# Patient Record
Sex: MALE | Race: WHITE | URBAN - METROPOLITAN AREA
[De-identification: names, ages, dates, MRNs, and addresses within clinical notes are randomized per-mention and may not be internally consistent; named-entity substitution may affect disease eponyms.]

---

## 2018-02-27 ENCOUNTER — OFFICE VISIT (OUTPATIENT)
Dept: DERMATOLOGY | Age: 53
End: 2018-02-27

## 2018-02-27 DIAGNOSIS — D22.9 MULTIPLE NEVI: Primary | ICD-10-CM

## 2018-02-27 DIAGNOSIS — L21.9 SEBORRHEIC DERMATITIS: ICD-10-CM

## 2018-02-27 DIAGNOSIS — L30.9 DERMATITIS: ICD-10-CM

## 2018-02-27 DIAGNOSIS — D48.5 NEOPLASM OF UNCERTAIN BEHAVIOR OF SKIN: ICD-10-CM

## 2018-02-27 PROCEDURE — 11100 PR BIOPSY OF SKIN LESION: CPT | Performed by: DERMATOLOGY

## 2018-02-27 PROCEDURE — 99214 OFFICE O/P EST MOD 30 MIN: CPT | Performed by: DERMATOLOGY

## 2018-02-27 RX ORDER — CLOBETASOL PROPIONATE 0.46 MG/ML
SOLUTION TOPICAL
Qty: 50 ML | Refills: 1 | Status: SHIPPED | OUTPATIENT
Start: 2018-02-27 | End: 2020-11-10 | Stop reason: SDUPTHER

## 2018-02-27 NOTE — PATIENT INSTRUCTIONS
Biopsy Wound Care Instructions    · Keep the bandage in place for 24 hours. · Cleanse the wound with mild soapy water daily   Gently dry the area.  Apply Vaseline or petroleum jelly to the wound using a cotton tipped applicator.  Cover with a clean bandage.  Repeat this process until the biopsy site is healed.  If you had stitches placed, continue treating the site until the stitches are removed. Remember to make an appointment to return to have your stitches removed by our staff.  You may shower and bathe as usual.       ** Biopsy results generally take around 7 business days to come back. If you have not heard from us by then, please call the office at [phone] between 8AM and 4PM Monday through Friday. Protecting Yourself From the Sun    · Apply broad spectrum water resistant sunscreen with an SPF of at least 30 to exposed areas of the skin. Dont forget the ears and lips! Remember to reapply sunscreen about every 2 hours and after swimming or sweating. · Wear sun protective clothing. Swim shirts (aka. rash guards) are a great idea and negates the need to reapply sunscreen in those areas.      · Seek the shade whenever possible especially between the hours of 10 am and 4 pm when the suns rays are the strongest.     · Avoid tanning beds

## 2018-02-27 NOTE — PROGRESS NOTES
Psych/Neuro, Head/face, Conjunctivae/eyelids, Gums/teeth/lips, Neck, Breast/axilla/chest, Abdomen, LUE, LLE, Nails/digits and buttocks. RUE. The following were examined and determined to be abnormal: Scalp and back, RLE  1. trunk and extremities with scattered brown macules and papules   L chest with larger brown macule - photo in media from 2014 - stable  2.  L upper back/posterior shoulder with iregularly-pigmented brown macule  3. Left temporal scalp with well-defined erythematous finely scaly patch  4. R upper inner thigh with erythematous scaly and slightly lichenified patch/thin plaque; KOH neg    Assessment and Plan      1. Benign-appearing nevi  - educ re ABCD's of MM   educ sun protection   encouraged skin check yearly (sooner if indicated), self checks    2. R/o dysplastic - L upper back/posterior shoulder  - Shave biopsy performed after verbal consent obtained. Patient educated regarding risk of bleeding, infection, scar and educated on wound care. Skin cleansed with alcohol pad and site anesthetized with lido + epi. Aluminum chloride applied to site for hemostasis. Petrolatum ointment and bandage applied. Specimen bottle labeled with patient information and site and specimen sent to [facility]. 3. L temporal scalp - seb derm vs psoriasis, mild  - clobetasol soln daily - bid prn flares; ed se/misuse    4.  Dermatitis - R upper inner thigh  - ultravate sampled  - ed call if worsening and consider topical antifungal

## 2018-03-09 ENCOUNTER — TELEPHONE (OUTPATIENT)
Dept: DERMATOLOGY | Age: 53
End: 2018-03-09

## 2018-03-16 ENCOUNTER — TELEPHONE (OUTPATIENT)
Dept: DERMATOLOGY | Age: 53
End: 2018-03-16

## 2018-04-19 ENCOUNTER — PROCEDURE VISIT (OUTPATIENT)
Dept: DERMATOLOGY | Age: 53
End: 2018-04-19

## 2018-04-19 DIAGNOSIS — D23.9 DYSPLASTIC NEVUS: Primary | ICD-10-CM

## 2018-04-19 PROCEDURE — 12032 INTMD RPR S/A/T/EXT 2.6-7.5: CPT | Performed by: DERMATOLOGY

## 2018-04-19 PROCEDURE — 11402 EXC TR-EXT B9+MARG 1.1-2 CM: CPT | Performed by: DERMATOLOGY

## 2018-04-26 ENCOUNTER — TELEPHONE (OUTPATIENT)
Dept: DERMATOLOGY | Age: 53
End: 2018-04-26

## 2020-11-10 ENCOUNTER — OFFICE VISIT (OUTPATIENT)
Dept: DERMATOLOGY | Age: 55
End: 2020-11-10
Payer: COMMERCIAL

## 2020-11-10 VITALS — TEMPERATURE: 98.1 F

## 2020-11-10 PROCEDURE — 17000 DESTRUCT PREMALG LESION: CPT | Performed by: DERMATOLOGY

## 2020-11-10 PROCEDURE — 11102 TANGNTL BX SKIN SINGLE LES: CPT | Performed by: DERMATOLOGY

## 2020-11-10 PROCEDURE — 99214 OFFICE O/P EST MOD 30 MIN: CPT | Performed by: DERMATOLOGY

## 2020-11-10 RX ORDER — CLOBETASOL PROPIONATE 0.46 MG/ML
SOLUTION TOPICAL
Qty: 50 ML | Refills: 1 | Status: SHIPPED | OUTPATIENT
Start: 2020-11-10

## 2020-11-10 NOTE — PATIENT INSTRUCTIONS
Biopsy Wound Care Instructions    · Keep the bandage in place for 24 hours. · Cleanse the wound with mild soapy water daily   Gently dry the area.  Apply Vaseline or petroleum jelly to the wound using a cotton tipped applicator.  Cover with a clean bandage.  Repeat this process until the biopsy site is healed.  If you had stitches placed, continue treating the site until the stitches are removed. Remember to make an appointment to return to have your stitches removed by our staff.  You may shower and bathe as usual.       ** Biopsy results generally take around 7 business days to come back. If you have not heard from us by then, please call the office at [phone] between 8AM and 4PM Monday through Friday. Cryosurgery (Freezing) Wound Care Instructions    AFTER THE PROCEDURE:    You will notice swelling and redness around the site. This is normal.    You may experience a sharp or sore feeling for the next several days. For this discomfort, you may take acetaminophen (Tylenol©).  A blister may develop at the treated area, sometimes as soon as by the end of the day. After several days, the blister will subside and a scab will form.  If the area is bumped or traumatized during the first few days following freezing, you may develop bleeding into the blister, forming a blood blister. This is nothing to be alarmed about.  If the blister is tense, uncomfortable, or much larger than the site that was frozen, you may pop the blister along its edge with a sterile needle (boiled, heated under a flame, or cleaned with alcohol) to allow the fluid to drain out. If the blister does not bother you, no treatment is needed.  Do NOT peel off the top of the blister roof. It will act as a dressing on top of your wound. WOUND CARE:    You may shower or bathe as usual, but avoid scrubbing the areas that have been frozen.      Cleanse the site twice a day with mild soapy water, and then apply a thin film of white petrolatum (Vaseline©).  You do not need to cover the area, but can if you prefer.  Do NOT allow the site to become dry or crusted, or attempt to dry it out with rubbing alcohol or hydrogen peroxide.  Continue this regimen until the area is pink and healed. Depending on the size and location of your cryosurgery site, healing may take 2 to 4 weeks.  The area may continue to be pink for several weeks, and over the next few months may become darker or lighter than the surrounding skin. This may be a permanent change.    

## 2020-11-10 NOTE — PROGRESS NOTES
[facility]  Bobby Kam  1965    54 y.o. male     Date of Visit: 11/10/2020    Chief Complaint: moles/lesions  Chief Complaint   Patient presents with    Skin Lesion     FSE      HX: multi nevi    Medication Refill     clobetasol, tetrahydrolozine     Last seen: 4-2018; *wife is a patient - Michael Johansen    History of Present Illness:    1. He has multiple asx nevi on the trunk and extremities, many present since child/young adult; no change in s/s/c of any lesions; no bleeding lesions; all asx   S/p bx of an irregularly pigmented, mottled pink and brown lesion on the left lower back. in 2014 - benign. 2. Hx of moderately dysplastic nevus - L upper back/posterior shoulder - excision 2018    3. F/u for seb derm - an intermittently itchy area on the left temporal scalp. Improved with clobetasol spray or soln in the past.  Mild recently - needs refills. 4. Hx of dermatitis - thigh. Cleared with topical steroid. 5. He has a concerning lesion on the lower back. Asx.     6. He has a rough lesion on the R upper lip x several mos. He was not concerned about it. Asx. Previously addressed: Intermittently pruritic eruption on the dorsum of R foot - cleared with triamcinolone. No personal hx of skin cancer; no fam hx of MM. Wife - Michael Johansen; knew from [facility] med. Review of Systems:  Gen: Feels well, good sense of health. Skin: No new or changing moles. Past Medical History, Family History, Surgical History, Medications and Allergies reviewed. 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 No pertinent past medical history. History reviewed. No pertinent surgical history.     Outpatient Medications Marked as Taking for the 11/10/20 encounter (Office Visit) with Rocael Cruz MD   Medication Sig Dispense Refill    clobetasol (TEMOVATE) 0.05 % external solution Apply to affected area BID PRN flares 50 mL 1   *using several different eye gtts. Allergies   Allergen Reactions    Dye [Barium-Containing Compounds] Other (See Comments)     Pt states he got a little hot       Physical Examination        The following were examined and determined to be normal: Psych/Neuro, Head/face, Conjunctivae/eyelids,, Neck, Breast/axilla/chest, Abdomen, LUE, LLE, Nails/digits and buttocks. RUE. RLE    The following were examined and determined to be abnormal: Scalp and back, lip    1. trunk and extremities with scattered brown macules and papules   L chest with larger brown macule - photo in media from 2014 - stable  2. L upper back/posterior shoulder with scar - clear  3. Left temporal scalp with focal mildly finely scaly patch  4. R upper inner thigh clear; no other dermatitis  5. Lower back - irregular brown macule  6. R upper lip with erythematous roughly scaled macule    Assessment and Plan      1. Benign-appearing nevi  2. Hx of dysplastic nevus - L upper back/posterior shoulder, excised 2018  - educ re ABCD's of MM   educ sun protection   encouraged skin check yearly (sooner if indicated), self checks    3. L temporal scalp - seb derm vs psoriasis, mild  - clobetasol soln daily - bid prn flares; ed se/misuse    4. Dermatitis - R upper inner thigh - cleared    5. Lower back - r/o dysplastic nevus  - Shave biopsy performed after verbal consent obtained. Patient educated regarding risk of bleeding, infection, scar and educated on wound care. Skin cleansed with alcohol pad and site anesthetized with lido + epi. Aluminum chloride applied to site for hemostasis. Petrolatum ointment and bandage applied. Specimen bottle labeled with patient information and site and specimen sent to [facility]. 6. AK - R upper lip - 1   - lesion(s)  treated with liquid nitrogen x 2 cycles. Patient educated on risk of blister, hypopigmentation/scar and wound care.

## 2020-11-12 LAB — DERMATOLOGY PATHOLOGY REPORT: NORMAL

## 2023-01-19 ENCOUNTER — OFFICE VISIT (OUTPATIENT)
Dept: DERMATOLOGY | Age: 58
End: 2023-01-19

## 2023-01-19 DIAGNOSIS — D22.9 MULTIPLE NEVI: Primary | ICD-10-CM

## 2023-01-19 DIAGNOSIS — Z86.018 HISTORY OF DYSPLASTIC NEVUS: ICD-10-CM

## 2023-01-19 DIAGNOSIS — L21.9 SEBORRHEIC DERMATITIS: ICD-10-CM

## 2023-01-19 DIAGNOSIS — D48.5 NEOPLASM OF UNCERTAIN BEHAVIOR OF SKIN: ICD-10-CM

## 2023-01-19 DIAGNOSIS — L57.0 AK (ACTINIC KERATOSIS): ICD-10-CM

## 2023-01-19 DIAGNOSIS — L81.4 LENTIGINES: ICD-10-CM

## 2023-01-19 RX ORDER — CLOBETASOL PROPIONATE 0.46 MG/ML
SOLUTION TOPICAL
Qty: 50 ML | Refills: 3 | Status: SHIPPED | OUTPATIENT
Start: 2023-01-19

## 2023-01-19 NOTE — PROGRESS NOTES
[facility]  Hien Runner, Jasonshire Alex Ely  1965    62 y.o. male     Date of Visit: 1/19/2023    Chief Complaint: moles/lesions  Chief Complaint   Patient presents with    Skin Exam     Last seen: ; *wife is a patient - Trey Hwang    History of Present Illness:    1. He has multiple asx nevi on the trunk and extremities, many present since child/young adult; no change in s/s/c of any lesions; no bleeding lesions; all asx   S/p bx of an irregularly pigmented, mottled pink and brown lesion on the left lower back. in 2014 - benign. 2. Hx of moderately dysplastic nevus - L upper back/posterior shoulder - excision 2018  *lower back - moderately dysplastic nevus - completely removed with bx     3. F/u for seb derm - an intermittently itchy area on the left temporal scalp. Improves with clobetasol spray or soln. Flares recently in the postauricular scalp. Overall mild but needs refills. 4. He has a concerning dark brown lesion on the lower back. He was not aware of it.    5. F/u AKs. He has a rough lesion on the R upper lip x several mos. He was not concerned about it. Asx. Hx of dermatitis - thigh. Cleared with topical steroid. Previously addressed: Intermittently pruritic eruption on the dorsum of R foot - cleared with triamcinolone. No personal hx of skin cancer; no fam hx of MM. Wife - Trey Hwang; knew from [facility] med. Review of Systems:  Gen: Feels well, good sense of health. Skin: No new or changing moles. Past Medical History, Family History, Surgical History, Medications and Allergies reviewed. 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 No pertinent past medical history. No past surgical history on file.     Outpatient Medications Marked as Taking for the 1/19/23 encounter (Office Visit) with Katelin Louis MD   Medication Sig Dispense Refill    tetrahydrozoline 0.05 % ophthalmic solution Place 1 drop into both eyes daily. *using several different eye gtts. Allergies   Allergen Reactions    Dye [Barium-Containing Compounds] Other (See Comments)     Pt states he got a little hot       Physical Examination      Gen, well-appearing  FSE today    1. trunk and extremities with scattered brown macules and papules   L chest with larger brown macule - photo in media from 2014 - stable  2. Scar(s) clear  3. Postauricular scalp with dry mildly erythematous patches  4. Central lower back with dark brown macule (somewhat larger than previous photo)  5. No AK's    Assessment and Plan      1. Benign-appearing nevi and lentigines  2. Hx of dysplastic nevi, no signs recurrence  *L upper back/posterior shoulder, excised 2018  *lower back - moderately dysplastic nevus - completely removed with bx   - Monitor for ABCD's of MM and si/sx of NMSC  Continue sun protection - OTC sunscreen with SPF 30-50+ recommended and reviewed usage  Encouraged skin check yearly (sooner if indicated), self checks    3. L temporal scalp - seb derm (ddx psoriasis), chronic, mildly flaring  - clobetasol soln daily - bid prn flares; ed se/misuse    4. Central lower back - r/o dysplastic  - Shave biopsy performed after verbal consent obtained. Patient educated regarding risk of bleeding, infection, scar and educated on wound care. Skin cleansed with alcohol pad and site anesthetized with lido + epi. Aluminum chloride applied to site for hemostasis. Petrolatum ointment and bandage applied. Specimen bottle labeled with patient information and site and specimen sent to [facility].       5. AK - no new concerns

## 2023-01-19 NOTE — PATIENT INSTRUCTIONS
Biopsy Wound Care Instructions    Keep the bandage in place for 24 hours. Cleanse the wound with mild soapy water daily  Gently dry the area. Apply Vaseline or petroleum jelly to the wound using a cotton tipped applicator. Cover with a clean bandage. Repeat this process until the biopsy site is healed. If you had stitches placed, continue treating the site until the stitches are removed. Remember to make an appointment to return to have your stitches removed by our staff. You may shower and bathe as usual.       ** Biopsy results generally take around 7 business days to come back. If you have not heard from us by then, please call the office at [phone]. *Please note that biopsy results are released to both the patient and physician at the same time in [address] Please allow time for your physician to review the results. One of our staff members will reach out to you with the results and plan. Protecting Yourself From the Sun    Apply an over-the-counter broad spectrum water resistant sunscreen with an SPF of at least 30 to exposed areas of the skin. Dont forget the ears and lips! Remember to reapply sunscreen about every 2 hours and after swimming or sweating. Wear sun protective clothing. Swim shirts (aka. rash guards) are a great idea and negates the need to reapply sunscreen in those areas.      Seek the shade whenever possible especially between the hours of 10 am and 4 pm when the suns rays are the strongest.     Avoid tanning beds